# Patient Record
Sex: MALE | Race: BLACK OR AFRICAN AMERICAN | NOT HISPANIC OR LATINO | Employment: FULL TIME | URBAN - METROPOLITAN AREA
[De-identification: names, ages, dates, MRNs, and addresses within clinical notes are randomized per-mention and may not be internally consistent; named-entity substitution may affect disease eponyms.]

---

## 2024-11-13 ENCOUNTER — OFFICE VISIT (OUTPATIENT)
Dept: ORTHOPEDIC SURGERY | Facility: CLINIC | Age: 39
End: 2024-11-13
Payer: COMMERCIAL

## 2024-11-13 ENCOUNTER — HOSPITAL ENCOUNTER (OUTPATIENT)
Dept: RADIOLOGY | Facility: CLINIC | Age: 39
Discharge: HOME | End: 2024-11-13
Payer: COMMERCIAL

## 2024-11-13 DIAGNOSIS — M25.511 RIGHT SHOULDER PAIN, UNSPECIFIED CHRONICITY: ICD-10-CM

## 2024-11-13 DIAGNOSIS — M75.81 RIGHT ROTATOR CUFF TENDINITIS: ICD-10-CM

## 2024-11-13 DIAGNOSIS — M25.511 RIGHT SHOULDER PAIN, UNSPECIFIED CHRONICITY: Primary | ICD-10-CM

## 2024-11-13 PROCEDURE — 20610 DRAIN/INJ JOINT/BURSA W/O US: CPT | Mod: RT | Performed by: STUDENT IN AN ORGANIZED HEALTH CARE EDUCATION/TRAINING PROGRAM

## 2024-11-13 PROCEDURE — 73030 X-RAY EXAM OF SHOULDER: CPT | Mod: RT

## 2024-11-13 PROCEDURE — 99214 OFFICE O/P EST MOD 30 MIN: CPT | Mod: 25 | Performed by: STUDENT IN AN ORGANIZED HEALTH CARE EDUCATION/TRAINING PROGRAM

## 2024-11-13 PROCEDURE — 99204 OFFICE O/P NEW MOD 45 MIN: CPT | Performed by: STUDENT IN AN ORGANIZED HEALTH CARE EDUCATION/TRAINING PROGRAM

## 2024-11-13 PROCEDURE — 2500000004 HC RX 250 GENERAL PHARMACY W/ HCPCS (ALT 636 FOR OP/ED): Performed by: STUDENT IN AN ORGANIZED HEALTH CARE EDUCATION/TRAINING PROGRAM

## 2024-11-13 PROCEDURE — 73030 X-RAY EXAM OF SHOULDER: CPT | Mod: RIGHT SIDE | Performed by: RADIOLOGY

## 2024-11-13 RX ORDER — LIDOCAINE HYDROCHLORIDE 10 MG/ML
5 INJECTION, SOLUTION INFILTRATION; PERINEURAL
Status: COMPLETED | OUTPATIENT
Start: 2024-11-13 | End: 2024-11-13

## 2024-11-13 RX ORDER — TRIAMCINOLONE ACETONIDE 40 MG/ML
80 INJECTION, SUSPENSION INTRA-ARTICULAR; INTRAMUSCULAR
Status: COMPLETED | OUTPATIENT
Start: 2024-11-13 | End: 2024-11-13

## 2024-11-13 NOTE — PROGRESS NOTES
CHIEF COMPLAINT: No chief complaint on file.    History: 39 y.o. male presents to the office today for evaluation of his right shoulder.  He is otherwise healthy.  He is very active with exercising at the gym.  He states that his right shoulder began to bother him over the last few weeks.  No specific injury he can remember.  Pain is primarily anterior and lateral.  Thinks he may have slept on it wrong.  Has not had any specific treatments yet.  Having significant pain and the pain is keeping up at night.    Past medical history, past surgical history, medications, allergies, family history, social history, and review of systems were reviewed today.    A 12 point review of systems was negative other than as stated in the HPI.    No past medical history on file.     Not on File     Past Surgical History:   Procedure Laterality Date    ADENOIDECTOMY  03/02/2016    Adenoidectomy    MOUTH SURGERY  04/03/2017    Oral Surgery Tooth Extraction        No family history on file.     Social History     Socioeconomic History    Marital status: Single     Spouse name: Not on file    Number of children: Not on file    Years of education: Not on file    Highest education level: Not on file   Occupational History    Not on file   Tobacco Use    Smoking status: Not on file    Smokeless tobacco: Not on file   Substance and Sexual Activity    Alcohol use: Not on file    Drug use: Not on file    Sexual activity: Not on file   Other Topics Concern    Not on file   Social History Narrative    Not on file     Social Drivers of Health     Financial Resource Strain: Not on file   Food Insecurity: Not on file   Transportation Needs: Not on file   Physical Activity: Not on file   Stress: Not on file   Social Connections: Not on file   Intimate Partner Violence: Not on file   Housing Stability: Not on file        CURRENT MEDICATIONS:   No current outpatient medications on file.     No current facility-administered medications for this visit.        Physical Examination:       No data to display               There is no height or weight on file to calculate BMI.    Well-appearing, appears stated age, pleasant and cooperative, appropriate mood and behavior. Height and weight reviewed. Alert and oriented x3.  Auditory function intact.  No acute distress.  Intact ocular function, KRISTAL, EOMI. Breathing is unlabored .  There is no evidence of jugular venous distension. Skin appearance is normal without evidence of rash or other lesions. 2+ radial pulses bilaterally, fingers pink and wwp, good capillary refill, no pitting edema. No appreciable lymphadenopathy in bilateral upper extremities. SILT throughout both upper extremities, median/radial/ulnar/musculocutaneous/axillary nerve motor and sensory intact (except for abnormalities noted in focused musculoskeletal exam section below).     Neck exam: Full range of motion of the neck in flexion/extension and rotational movements. No significant areas of tenderness to palpation in the neck.    On exam of bilateral upper extremities, limited active range of motion of the right shoulder.  The left, forward flexion of 130, external Tatian to 40, internal rotation to T12.  On the left she has forward flexion to 160, external rotation to 80, internal rotation to T12.  However, with exertion he can achieve full range of motion but it is quite painful.  Full passive range of motion.  Rotator cuff strength is preserved bilaterally but very painful on the right.  Pain with Neer and Stout maneuvers of the right shoulder.    Imaging: Radiographs of the right shoulder performed today.  Personally interpreted by myself.  Preserved glenohumeral joint space.  Preserved acromiohumeral interval.  No acute fractures noted.       Assessment: Right rotator cuff tendinitis    Plan: Patient's symptoms, test result and exam are consistent with a diagnosis of rotator cuff tendinitis.  We did discuss the natural history of this.   Conservative management is often the first-line treatment for this, which includes physical therapy, injections and activity modification.  In rare cases of recalcitrant pain, surgery may be indicated, but this is only after extensive nonoperative care.  Patient understands this.  They wish to proceed with PT and injection.    Injection was performed, please see separate procedure note, patient tolerated well.     Patient ID: Mayco Magaña is a 39 y.o. male.    L Inj/Asp: R subacromial bursa on 11/13/2024 12:19 PM  Indications: pain  Details: 22 G needle, posterior approach  Medications: 80 mg triamcinolone acetonide 40 mg/mL; 5 mL lidocaine 10 mg/mL (1 %)  Outcome: tolerated well, no immediate complications  Procedure, treatment alternatives, risks and benefits explained, specific risks discussed. Consent was given by the patient. Immediately prior to procedure a time out was called to verify the correct patient, procedure, equipment, support staff and site/side marked as required. Patient was prepped and draped in the usual sterile fashion.           Dragon software was used to dictate this note, please be aware that minor errors in transcription may be present.    Jett Mir MD    Shoulder/Elbow Surgery  [facility]

## 2024-12-20 ENCOUNTER — APPOINTMENT (OUTPATIENT)
Dept: PHYSICAL THERAPY | Facility: CLINIC | Age: 39
End: 2024-12-20
Payer: COMMERCIAL

## 2024-12-20 NOTE — PROGRESS NOTES
Physical Therapy  Physical Therapy Orthopedic Evaluation    Patient Name: Mayco Magaña  MRN: [id number]  Today's Date: 12/20/2024                  Insurance:  Visit number: 1  Authorization info: No Auth  Insurance Type: Payor: MEDICAL [facility] / Plan: MEDICAL [employer] / Product Type: *No Product type* /      Current Problem     Problem List Items Addressed This Visit    None      Surgery:    Referred by: Jett Mir MD    Precautions:       Subjective:   Subjective   Chief Complaint: R shoulder   Onset: 1-2 months ago  ARYAN: ***    General:    right shoulder.  He is otherwise healthy.  He is very active with exercising at the gym.  He states that his right shoulder began to bother him over the last few weeks.  No specific injury he can remember.  Pain is primarily anterior and lateral.  Thinks he may have slept on it wrong.  Has not had any specific treatments yet.  Having significant pain and the pain is keeping up at night.   Current Condition:   {Current Condition:92783}    Pain:     Location: R shoulder   Rating: Best-***, Current-***, Worst-***  Description: ***  Aggravating Factors:  ***  Relieving Factors:  ***    Relevant Information (PMH & Previous Tests/Imaging): Preserved glenohumeral joint space.  Preserved acromiohumeral interval.  No acute fractures noted.     Previous Interventions/Treatments: Injections    Prior Level of Function (PLOF)  Patient previously independent with all ADLs  Exercise/Physical Activity: ***  Work/School: ***    Patients Living Environment: Reviewed and no concern  Primary Language: English  Patient's Goal(s) for Therapy: ***    Red Flags: Do you have any of the following? No  Fever/chills, unexplained weight changes, dizziness/fainting, unexplained change in bowel or bladder functions, unexplained malaise or muscle weakness, night pain/sweats, numbness or tingling    Objective:  Objective     Palpation/Observation  ***  Posture  ***  Special  Testing  ***  ROM  12/20/2024  ***  Strength  12/20/2024  ***  Sensation  ***  Reflexes  ***    Transfers: ***  Gait: ***  Functional Reach test ER  Functional IR behind the back     Outcome Measures:      12/20/2024  Treatments:  Ther-EX:  ***  There- ACT:  ***  Neuro:  ***  Manual:  ***  Modalities:  ***               EDUCATION: Home exercise program, plan of care, activity modifications, pain management, and injury pathology     Code: ***    Medical History Form: Reviewed (scanned into chart)  Reviewed medical form, [place], Falls risk, Adventist beliefs, PHQ     Screening  Frequency  Date Last Completed   Spiritual and Cultural Beliefs   Screening each visit or episode of care    Falls Risk Screening every ambulatory visit    Pain Screening annually at primary care visit     Domestic Violence screening annually at primary care visit    Depression Screening annually in the primary care setting    Suicide Risk Screening annually in the primary care setting    Nutrition and Food Insecurity   Screening at least annually at primary care visit     Key Learner annually in the primary care setting    Drug Screen     Alcohol Screen     Advance Directive                       Assessment: Patient presents with signs and symptoms consistent with ***, resulting in limited participation in pain-free ADLs and inability to perform at their prior level of function. Pt would benefit from physical therapy to address the impairments found & listed previously in the objective section in order to return to safe and pain-free ADLs and prior level of function.     Complexity:Low  Prognosis: ***  Response to care: ***  Clinical Presentation: Stable and/or uncomplicated characteristics  Personal Factors: {Personal Factors Affecting Care:47774}    Problem List:  Pain  Function  Mobility  Strength  Plan:     Planned Interventions include: therapeutic exercise, self-care home management, manual therapy, therapeutic activities, gait  training, neuromuscular coordination, vasopneumatic, dry needling, aquatic therapy    Next Treatment:***  Frequency: 1-2 x Week  Duration: 8 Weeks  Goals: Set and discussed today  4 weeks  Patient to have pain less than *** for improved  Patient to be independent with HEP and progression for improved carryover  Improved ability to perform ***  Improved tolerance with ***     8 weeks  Patient to have improved *** score for improved function at home  Patient to have pain less than ***/10 for improved ADL performance  Improved *** for better function with daily activities  ROM *** improved to for participation in ***     Plan of care was developed with input and agreement by the patient      Javi May, PT

## 2025-01-16 ENCOUNTER — EVALUATION (OUTPATIENT)
Dept: PHYSICAL THERAPY | Facility: CLINIC | Age: 40
End: 2025-01-16
Payer: COMMERCIAL

## 2025-01-16 DIAGNOSIS — M25.511 RIGHT SHOULDER PAIN, UNSPECIFIED CHRONICITY: ICD-10-CM

## 2025-01-16 DIAGNOSIS — M75.81 RIGHT ROTATOR CUFF TENDINITIS: ICD-10-CM

## 2025-01-16 PROCEDURE — 97140 MANUAL THERAPY 1/> REGIONS: CPT | Mod: GP | Performed by: PHYSICAL THERAPIST

## 2025-01-16 PROCEDURE — 97110 THERAPEUTIC EXERCISES: CPT | Mod: GP | Performed by: PHYSICAL THERAPIST

## 2025-01-16 PROCEDURE — 97161 PT EVAL LOW COMPLEX 20 MIN: CPT | Mod: GP | Performed by: PHYSICAL THERAPIST

## 2025-01-16 NOTE — PROGRESS NOTES
Physical Therapy  Physical Therapy Orthopedic Evaluation    Patient Name: Mayco Magaña  MRN: [id number]  Today's Date: 1/16/2025  Time Calculation  Start Time: 1609  Stop Time: 1650  Time Calculation (min): 41 min    PT Evaluation Time Entry  PT Evaluation (Low) Time Entry: 15  PT Therapeutic Procedures Time Entry  Manual Therapy Time Entry: 8  Therapeutic Exercise Time Entry: 15       Insurance:  Visit number: 1  Authorization info: 30 VISITS  Insurance Type: Payor: MEDICAL [facility] / Plan: MEDICAL [employer] / Product Type: *No Product type* /      Current Problem     Problem List Items Addressed This Visit    None  Visit Diagnoses         Codes    Right shoulder pain, unspecified chronicity     M25.511    Relevant Orders    Follow Up In Physical Therapy    Right rotator cuff tendinitis     M75.81    Relevant Orders    Follow Up In Physical Therapy            Surgery:    Referred by: Jett Mir MD  Precautions:       Subjective:   Subjective   Chief Complaint: R shoulder  Onset: 3 months  ARYAN: No ARYAN    General:   He states that his right shoulder began to bother him over the last few weeks. No specific injury he can remember. Pain is primarily anterior and lateral. Thinks he may have slept on it wrong   Current Condition:   Better, a little better after cortisone    Pain:     Location: R shoulder, anterior and lateral shoulder   Rating: Best-6, Current-6, Worst-8  Description: achy and sharp  Aggravating Factors:  sleeping, reaching behind back, lifting something in front of body  Relieving Factors:  No relieving factors     Relevant Information (PMH & Previous Tests/Imaging): No testing     Previous Interventions/Treatments: None    Prior Level of Function (PLOF)  Patient previously independent with all ADLs  Exercise/Physical Activity: announcing, exercise, travel  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  Improved mobility    Red Flags: Do you have any of the following? No  Fever/chills, unexplained weight changes, dizziness/fainting, unexplained change in bowel or bladder functions, unexplained malaise or muscle weakness, night pain/sweats, numbness or tingling    Objective:  Objective   R shoulder   Palpation/Observation  Mild tenderness to palpation lateral subacromial, posterior lateral rotator cuff insertion  Posture  Mildly flattened thoracic kyphosis, rounded shoulders, slightly forward head position  Special Testing  Positive-painful arc, Stout Moisés, cross body adduction, Neer's impingement, empty can  ROM  1/17/2025  Right shoulder  Actively flexion 125 degrees  Abduction 105 degrees, internal rotation L2, external rotation essentially to C7.  All motions are painful  Passively supine position joint mobility unrestricted without capsular feel,  Flexion 150 degrees, internal rotation 60 degrees, external rotation 75 degrees  Thoracic mobility [place] glides unrestricted, no pain reproduced  Strength  1/17/2025  Left side is unrestricted with muscle testing, right side abduction 4 -/5 with pain, external rotation 4/5 with pain, all other areas are within normal limits without pain  Sensation  No radic   Reflexes      Transfers: WNL UE assist   Gait: WNL  Functional Reach test ER  Functional IR behind the back     Outcome Measures:      1/17/2025  Treatments:  Ther-EX:  Supine cane flexion reviewed  Seated bilateral external rotation with red band x 15  Standing wall slide x 15 bilateral  Pec stretch doorway 3 x 20 seconds      Manual:  Passive range of motion of the right shoulder all planes and directions utilization of low load distraction with flexion/scaption and internal rotation, posterior capsule stretching  Modalities:  Patient did utilize moist hot pack right shoulder end of treatment for pain  reduction    PT Evaluation Time Entry  PT Evaluation (Low) Time Entry: 15  PT Therapeutic Procedures Time Entry  Manual Therapy Time Entry: 8  Therapeutic Exercise Time Entry: 15       EDUCATION: Home exercise program, plan of care, activity modifications, pain management, and injury pathology     Code:  [id number]     Medical History Form: Reviewed (scanned into chart)  Reviewed medical form, [place], Falls risk, Rastafari beliefs, PHQ     Screening  Frequency  Date Last Completed   Spiritual and Cultural Beliefs   Screening each visit or episode of care    Falls Risk Screening every ambulatory visit    Pain Screening annually at primary care visit     Domestic Violence screening annually at primary care visit    Depression Screening annually in the primary care setting    Suicide Risk Screening annually in the primary care setting    Nutrition and Food Insecurity   Screening at least annually at primary care visit     Key Learner annually in the primary care setting    Drug Screen     Alcohol Screen     Advance Directive       Time Calculation  Start Time: 1609  Stop Time: 1650  Time Calculation (min): 41 min  PT Evaluation Time Entry  PT Evaluation (Low) Time Entry: 15 PT Therapeutic Procedures Time Entry  Manual Therapy Time Entry: 8  Therapeutic Exercise Time Entry: 15          Assessment: Patient presents with signs and symptoms consistent with right shoulder postural impingement, resulting in limited participation in pain-free ADLs and inability to perform at their prior level of function. Pt would benefit from physical therapy to address the impairments found & listed previously in the objective section in order to return to safe and pain-free ADLs and prior level of function.     Complexity:Low  Prognosis: Good  Response to care: Good  Clinical Presentation: Stable and/or uncomplicated characteristics  Personal Factors: None    Problem List:  Pain  Function  Mobility  Strength  Plan:     Planned  Interventions include: therapeutic exercise, self-care home management, manual therapy, therapeutic activities, gait training, neuromuscular coordination, vasopneumatic, dry needling, aquatic therapy    Next Treatment: Progress postural activities, horizontal abduction, mid and low trap exercises  Frequency: 1-2 x Week  Duration: 8 Weeks  Goals: Set and discussed today  6 weeks   1.Patient to be independent with HEP for progressions and carryover    2. Patient to report pain less than 4/10 for return to improved sleeping tolerance greater than 1 hour    3. Patient to have improved right shoulder flexion to assist with returning to overhead activity    4. Improved QuickDASH outcome by 15 points measure for improved pain and function with daily activities    5.  Improved R shoulder abd 4+/5 for improved tolerance to dynamic activity    6. Improved pain less than 2/10 performing seated work exercise for better comfort and baseline pain       Plan of care was developed with input and agreement by the patient      Javi May PT

## 2025-01-23 ENCOUNTER — APPOINTMENT (OUTPATIENT)
Dept: PHYSICAL THERAPY | Facility: CLINIC | Age: 40
End: 2025-01-23
Payer: COMMERCIAL

## 2025-01-28 ENCOUNTER — APPOINTMENT (OUTPATIENT)
Dept: PHYSICAL THERAPY | Facility: CLINIC | Age: 40
End: 2025-01-28
Payer: COMMERCIAL

## 2025-02-13 ENCOUNTER — APPOINTMENT (OUTPATIENT)
Dept: PHYSICAL THERAPY | Facility: CLINIC | Age: 40
End: 2025-02-13
Payer: COMMERCIAL

## 2025-07-02 ENCOUNTER — APPOINTMENT (OUTPATIENT)
Dept: URGENT CARE | Age: 40
End: 2025-07-02
Payer: COMMERCIAL